# Patient Record
Sex: MALE | Race: WHITE | Employment: OTHER | URBAN - METROPOLITAN AREA
[De-identification: names, ages, dates, MRNs, and addresses within clinical notes are randomized per-mention and may not be internally consistent; named-entity substitution may affect disease eponyms.]

---

## 2019-08-14 ENCOUNTER — APPOINTMENT (OUTPATIENT)
Dept: GENERAL RADIOLOGY | Age: 34
End: 2019-08-14

## 2019-08-14 ENCOUNTER — HOSPITAL ENCOUNTER (EMERGENCY)
Age: 34
Discharge: HOME | End: 2019-08-14

## 2019-08-14 VITALS
WEIGHT: 141.31 LBS | DIASTOLIC BLOOD PRESSURE: 60 MMHG | HEART RATE: 60 BPM | HEIGHT: 72 IN | OXYGEN SATURATION: 99 % | BODY MASS INDEX: 19.14 KG/M2 | SYSTOLIC BLOOD PRESSURE: 116 MMHG | TEMPERATURE: 97.1 F | RESPIRATION RATE: 16 BRPM

## 2019-08-14 DIAGNOSIS — S61.216A LACERATION OF RIGHT LITTLE FINGER WITHOUT FOREIGN BODY WITHOUT DAMAGE TO NAIL, INITIAL ENCOUNTER: Primary | ICD-10-CM

## 2019-08-14 PROCEDURE — 2500000003 HC RX 250 WO HCPCS: Performed by: PHYSICIAN ASSISTANT

## 2019-08-14 PROCEDURE — 6360000002 HC RX W HCPCS: Performed by: PHYSICIAN ASSISTANT

## 2019-08-14 PROCEDURE — 6370000000 HC RX 637 (ALT 250 FOR IP): Performed by: PHYSICIAN ASSISTANT

## 2019-08-14 PROCEDURE — 99283 EMERGENCY DEPT VISIT LOW MDM: CPT

## 2019-08-14 PROCEDURE — 4500000023 HC ED LEVEL 3 PROCEDURE

## 2019-08-14 PROCEDURE — 73130 X-RAY EXAM OF HAND: CPT

## 2019-08-14 PROCEDURE — 96372 THER/PROPH/DIAG INJ SC/IM: CPT

## 2019-08-14 RX ORDER — CEFAZOLIN SODIUM 1 G/3ML
1 INJECTION, POWDER, FOR SOLUTION INTRAMUSCULAR; INTRAVENOUS ONCE
Status: COMPLETED | OUTPATIENT
Start: 2019-08-14 | End: 2019-08-14

## 2019-08-14 RX ORDER — MORPHINE SULFATE 10 MG/ML
4 INJECTION, SOLUTION INTRAMUSCULAR; INTRAVENOUS ONCE
Status: DISCONTINUED | OUTPATIENT
Start: 2019-08-14 | End: 2019-08-14

## 2019-08-14 RX ORDER — CEPHALEXIN 500 MG/1
500 CAPSULE ORAL 3 TIMES DAILY
Qty: 21 CAPSULE | Refills: 0 | Status: SHIPPED | OUTPATIENT
Start: 2019-08-14 | End: 2019-08-21

## 2019-08-14 RX ORDER — OXYCODONE HYDROCHLORIDE AND ACETAMINOPHEN 5; 325 MG/1; MG/1
2 TABLET ORAL ONCE
Status: COMPLETED | OUTPATIENT
Start: 2019-08-14 | End: 2019-08-14

## 2019-08-14 RX ORDER — IBUPROFEN 800 MG/1
800 TABLET ORAL EVERY 8 HOURS PRN
Qty: 20 TABLET | Refills: 0 | Status: SHIPPED | OUTPATIENT
Start: 2019-08-14

## 2019-08-14 RX ORDER — OXYCODONE HYDROCHLORIDE AND ACETAMINOPHEN 5; 325 MG/1; MG/1
1 TABLET ORAL EVERY 6 HOURS PRN
Qty: 10 TABLET | Refills: 0 | Status: SHIPPED | OUTPATIENT
Start: 2019-08-14 | End: 2019-08-17

## 2019-08-14 RX ORDER — KETOROLAC TROMETHAMINE 30 MG/ML
30 INJECTION, SOLUTION INTRAMUSCULAR; INTRAVENOUS ONCE
Status: COMPLETED | OUTPATIENT
Start: 2019-08-14 | End: 2019-08-14

## 2019-08-14 RX ORDER — LIDOCAINE HYDROCHLORIDE 10 MG/ML
5 INJECTION, SOLUTION INFILTRATION; PERINEURAL ONCE
Status: COMPLETED | OUTPATIENT
Start: 2019-08-14 | End: 2019-08-14

## 2019-08-14 RX ORDER — BUPIVACAINE HYDROCHLORIDE 5 MG/ML
30 INJECTION, SOLUTION PERINEURAL ONCE
Status: COMPLETED | OUTPATIENT
Start: 2019-08-14 | End: 2019-08-14

## 2019-08-14 RX ADMIN — BUPIVACAINE HYDROCHLORIDE 150 MG: 5 INJECTION, SOLUTION PERINEURAL at 16:30

## 2019-08-14 RX ADMIN — KETOROLAC TROMETHAMINE 30 MG: 30 INJECTION, SOLUTION INTRAMUSCULAR at 17:33

## 2019-08-14 RX ADMIN — OXYCODONE HYDROCHLORIDE AND ACETAMINOPHEN 2 TABLET: 5; 325 TABLET ORAL at 15:27

## 2019-08-14 RX ADMIN — LIDOCAINE HYDROCHLORIDE 5 ML: 10 INJECTION, SOLUTION INFILTRATION; PERINEURAL at 16:31

## 2019-08-14 RX ADMIN — CEFAZOLIN 1 G: 330 INJECTION, POWDER, FOR SOLUTION INTRAMUSCULAR; INTRAVENOUS at 17:32

## 2019-08-14 ASSESSMENT — PAIN DESCRIPTION - PAIN TYPE
TYPE: ACUTE PAIN
TYPE: ACUTE PAIN

## 2019-08-14 ASSESSMENT — PAIN DESCRIPTION - FREQUENCY: FREQUENCY: CONTINUOUS

## 2019-08-14 ASSESSMENT — PAIN SCALES - GENERAL
PAINLEVEL_OUTOF10: 10
PAINLEVEL_OUTOF10: 3
PAINLEVEL_OUTOF10: 10
PAINLEVEL_OUTOF10: 10
PAINLEVEL_OUTOF10: 7

## 2019-08-14 ASSESSMENT — PAIN DESCRIPTION - DESCRIPTORS
DESCRIPTORS: ACHING
DESCRIPTORS: ACHING

## 2019-08-14 ASSESSMENT — ENCOUNTER SYMPTOMS
VOMITING: 0
NAUSEA: 1

## 2019-08-14 ASSESSMENT — PAIN DESCRIPTION - PROGRESSION: CLINICAL_PROGRESSION: GRADUALLY IMPROVING

## 2019-08-14 ASSESSMENT — PAIN DESCRIPTION - LOCATION
LOCATION: FINGER (COMMENT WHICH ONE)
LOCATION: HAND

## 2019-08-14 ASSESSMENT — PAIN DESCRIPTION - ORIENTATION
ORIENTATION: RIGHT
ORIENTATION: RIGHT

## 2019-08-14 ASSESSMENT — PAIN DESCRIPTION - ONSET: ONSET: SUDDEN

## 2019-08-14 ASSESSMENT — PAIN - FUNCTIONAL ASSESSMENT: PAIN_FUNCTIONAL_ASSESSMENT: ACTIVITIES ARE NOT PREVENTED

## 2019-08-14 NOTE — ED PROVIDER NOTES
08/14/19 1522 08/14/19 1928   BP: 114/65 116/60   Pulse: (!) 47 60   Resp: 19 16   Temp: 97.1 °F (36.2 °C)    TempSrc: Oral    SpO2: 97% 99%   Weight: 141 lb 5 oz (64.1 kg)    Height: 6' (1.829 m)        Patient was nontoxic, well appearing, afebrile with normal vital signs with the exception of bradycardia 47. Patient reports he was holding his breath during this. Repeat was normal.. Saturating well on room air. Given percocet for pain. Hand is so dirty that had to start with soaking it so that I can reassess after it is clean. Digital block performed. After significant amount of cleaning, I am able to further evaluate the wound. There is a 4 cm laceration over the ulnar dorsal aspect of the right pinkie finger. There is exposed tendon, but it appears intact. Bones is also exposed. Laceration was repaired. See note below. The ulnar aspect of the ring finger has superficial skin avulsions. There is nothing I can repair. Patient was instructed to wear finger splint while sutures in place. FU with Dr. Keegan Macias in 1-2 days for reeval and given and urgent referral.  Will dc with keflex since he had exposed tendon. Was given ancef here. Xray was negative. Will also dc with percocet and ibuprofen. Return for worsening. He agreed and understood. PROCEDURES:  Lac Repair  Date/Time: 8/14/2019 7:31 PM  Performed by: SHIRA Sher  Authorized by: SHIRA Sher     Consent:     Consent obtained:  Verbal    Consent given by:  Patient    Risks discussed:  Infection, pain and tendon damage  Anesthesia (see MAR for exact dosages):      Anesthesia method:  Nerve block    Block location:  Digital    Block anesthetic:  Lidocaine 1% w/o epi and bupivacaine 0.5% w/o epi    Block injection procedure:  Anatomic landmarks identified, introduced needle and incremental injection    Block outcome:  Anesthesia achieved  Laceration details:     Location:  Finger    Finger location:  R small finger    Length (cm):  4  Repair type:     Repair type: Intermediate  Pre-procedure details:     Preparation:  Patient was prepped and draped in usual sterile fashion  Exploration:     Wound exploration: wound explored through full range of motion      Wound extent: no foreign bodies/material noted, no tendon damage noted and no underlying fracture noted      Wound extent comment:  +exposed tendon  Treatment:     Area cleansed with:  Hibiclens    Amount of cleaning:  Standard    Irrigation solution:  Sterile saline    Irrigation method:  Syringe (100 mL via syringe by me. Copious, copious irrigation and cleaning by ED medici)  Skin repair:     Repair method:  Sutures    Suture size:  3-0    Suture material:  Nylon    Suture technique:  Simple interrupted    Number of sutures:  7  Approximation:     Approximation:  Close  Post-procedure details:     Dressing:  Splint for protection and non-adherent dressing    Patient tolerance of procedure: Tolerated well, no immediate complications          FINAL IMPRESSION      1. Laceration of right little finger without foreign body without damage to nail, initial encounter          DISPOSITION/PLAN   DISPOSITION Decision To Discharge 08/14/2019 07:35:23 PM      PATIENT REFERRED TO:  Brad Santamaria MD  [address] [place] [address]  [phone]    Schedule an appointment as soon as possible for a visit in 1 day  for reevaluation    [facility] Emergency Department  [address] [place] [address]  [phone]    As needed, If symptoms worsen      DISCHARGE MEDICATIONS:  New Prescriptions    CEPHALEXIN (KEFLEX) 500 MG CAPSULE    Take 1 capsule by mouth 3 times daily for 7 days    IBUPROFEN (ADVIL;MOTRIN) 800 MG TABLET    Take 1 tablet by mouth every 8 hours as needed for Pain    OXYCODONE-ACETAMINOPHEN (PERCOCET) 5-325 MG PER TABLET    Take 1 tablet by mouth every 6 hours as needed for Pain for up to 3 days.

## 2019-08-14 NOTE — ED NOTES
Pt right pinky and ring finger wrapped and finger splint.      Andrae Harvey, [place]  08/14/19 1929

## 2019-08-14 NOTE — ED NOTES
Pt refuses morphine, states he feels Ok from PO pain meds and doesn't like to be \"high\". Lisa SILVA notified.      Shelbie Rose RN  08/14/19 4615

## 2019-08-16 ENCOUNTER — TELEPHONE (OUTPATIENT)
Dept: ORTHOPEDIC SURGERY | Age: 34
End: 2019-08-16

## 2019-08-19 ENCOUNTER — OFFICE VISIT (OUTPATIENT)
Dept: ORTHOPEDIC SURGERY | Age: 34
End: 2019-08-19

## 2019-08-19 VITALS
BODY MASS INDEX: 19.1 KG/M2 | RESPIRATION RATE: 16 BRPM | WEIGHT: 141 LBS | HEART RATE: 55 BPM | DIASTOLIC BLOOD PRESSURE: 75 MMHG | SYSTOLIC BLOOD PRESSURE: 116 MMHG | HEIGHT: 72 IN

## 2019-08-19 DIAGNOSIS — S61.219A FINGER LACERATION, INITIAL ENCOUNTER: Primary | ICD-10-CM

## 2019-08-19 PROCEDURE — 99203 OFFICE O/P NEW LOW 30 MIN: CPT | Performed by: ORTHOPAEDIC SURGERY

## 2019-08-19 RX ORDER — HYDROCODONE BITARTRATE AND ACETAMINOPHEN 5; 325 MG/1; MG/1
1 TABLET ORAL EVERY 6 HOURS PRN
Qty: 10 TABLET | Refills: 0 | Status: SHIPPED | OUTPATIENT
Start: 2019-08-19 | End: 2019-08-26

## 2019-08-19 NOTE — TELEPHONE ENCOUNTER
Patient called to schedule an appt. Spoke to Tin states patient can be seen on 8/21 w/ JBW. Offered patient appt, he became very upset stating that ER told him to be seen on 8/16 or today. Spoke w/ Tin ok to add patient on for today at 1:30p w/ CBW.

## 2019-08-20 NOTE — PROGRESS NOTES
Finger, Middle Finger, Ring Finger and Small Finger, FDP function is Intact in the Index Finger, Middle Finger, Ring Finger and Small Finger, common extensor function is Intact in the Index Finger, Middle Finger, Ring Finger and Small Finger. Thumb EPL Function is Intact, Thumb EPB Function is Intact, Thumb FPL Function is Intact. All other digits demonstrate intact tendon function and motion bilaterally. Wrist range of motion is Full on the Right, normal on the Left  There is no evidence of gross joint instability bilaterally. Muscular strength is clinically appropriate bilaterally, excepting the injured structures noted above. Sensation is present in the Radial Digital Nerve and Ulnar Digital Nerve distribution of the right Ring Finger and Small Finger all other digits demonstrate normal sensation bilaterally. Vascular examination reveals normal and good capillary refill bilaterally. There is mild acute ecchymosis in the injured digit(s). Swelling is moderate in the Ring Finger and Small Finger, absent elsewhere bilaterally  Maximal pain is elicited with palpation of the Ulnar region of the Ring Finger and Small Finger on the Right, normal on the Left. The base of the hand & wrist are not tender to palpation      Radiographic Evaluation:  Radiographs were reviewed today (3 views of the right hand). They demonstrate no evidence of acute fracture, subluxation, or dislocation. There is no degenerative change at the 1st [facility] joint, no degenerative change at the small joints of the fingers diffusely. Impression:  Mr. Kike Barnes has sustained recent right Ring Finger and Small Finger laceration without associated nerve laceration, without associated tendon laceration. He  presents requesting further treatment. Plan:    I have discussed with Mr. Kike Barnes the various treatment options for treatment of his right Ring Finger and Small Finger Laceration.   We discussed the options of